# Patient Record
Sex: FEMALE | Race: OTHER
[De-identification: names, ages, dates, MRNs, and addresses within clinical notes are randomized per-mention and may not be internally consistent; named-entity substitution may affect disease eponyms.]

---

## 2018-09-18 ENCOUNTER — HOSPITAL ENCOUNTER (OUTPATIENT)
Dept: HOSPITAL 72 - RAD | Age: 68
Discharge: HOME | End: 2018-09-18
Payer: MEDICARE

## 2018-09-18 DIAGNOSIS — Z98.1: ICD-10-CM

## 2018-09-18 DIAGNOSIS — R50.9: Primary | ICD-10-CM

## 2018-09-18 DIAGNOSIS — R05: ICD-10-CM

## 2018-09-18 PROCEDURE — 71046 X-RAY EXAM CHEST 2 VIEWS: CPT

## 2018-09-18 NOTE — DIAGNOSTIC IMAGING REPORT
Indication: Cough

 

Technique: 2 views of the chest

 

Comparison: None

 

Findings: There is some linear atelectasis or scarring the left lung base. The heart

size is normal. The aorta is tortuous calcified and ectatic. There is spinal fusion

hardware in the lumbar spine. No evidence of prior CABG. The bones are osteoporotic

 

Impression: No acute process. Findings as noted